# Patient Record
Sex: FEMALE | Race: BLACK OR AFRICAN AMERICAN | Employment: FULL TIME | URBAN - METROPOLITAN AREA
[De-identification: names, ages, dates, MRNs, and addresses within clinical notes are randomized per-mention and may not be internally consistent; named-entity substitution may affect disease eponyms.]

---

## 2024-03-04 ENCOUNTER — HOSPITAL ENCOUNTER (EMERGENCY)
Age: 34
Discharge: HOME | End: 2024-03-04
Attending: EMERGENCY MEDICINE
Payer: MEDICAID

## 2024-03-04 VITALS
HEART RATE: 77 BPM | OXYGEN SATURATION: 99 % | WEIGHT: 260 LBS | DIASTOLIC BLOOD PRESSURE: 91 MMHG | SYSTOLIC BLOOD PRESSURE: 130 MMHG | BODY MASS INDEX: 41.78 KG/M2 | TEMPERATURE: 99 F | HEIGHT: 66 IN | RESPIRATION RATE: 20 BRPM

## 2024-03-04 DIAGNOSIS — J11.1 INFLUENZA: Primary | ICD-10-CM

## 2024-03-04 LAB
POCT INFLUENZA A: NEGATIVE
POCT INFLUENZA B: POSITIVE
SARS-COV-2 RNA RESP QL NAA+PROBE: NOT DETECTED

## 2024-03-04 PROCEDURE — 87502 INFLUENZA DNA AMP PROBE: CPT | Performed by: EMERGENCY MEDICINE

## 2024-03-04 PROCEDURE — 99283 EMERGENCY DEPT VISIT LOW MDM: CPT

## 2024-03-04 PROCEDURE — 87502 INFLUENZA DNA AMP PROBE: CPT

## 2024-03-04 RX ORDER — ACETAMINOPHEN 500 MG
1000 TABLET ORAL ONCE
Status: COMPLETED | OUTPATIENT
Start: 2024-03-04 | End: 2024-03-04

## 2024-03-05 NOTE — DISCHARGE INSTRUCTIONS
Tylenol or Motrin for fever    Flonase and nasal saline for sinuses    Push fluids    Robitussin for cough

## 2024-03-05 NOTE — ED PROVIDER NOTES
Patient Seen in: [facility] In [place]      History     Chief Complaint   Patient presents with    Fever     Stated Complaint: fever, cough    Subjective:   HPI    Patient is a 33-year-old female presents emergency room for evaluation of flulike symptoms.  Patient has had symptoms for 4 days.  She complains fever, cough, runny nose, body aches, headache and sore throat.  Patient denies shortness of breath.  No other complaints    Objective:   History reviewed. No pertinent past medical history.           History reviewed. No pertinent surgical history.             No pertinent social history.            Review of Systems    Positive for stated complaint: fever, cough  Other systems are as noted in HPI.  Constitutional and vital signs reviewed.      All other systems reviewed and negative except as noted above.    Physical Exam     ED Triage Vitals [03/04/24 1758]   BP (!) 130/91   Pulse 86   Resp 18   Temp 99 °F (37.2 °C)   Temp src Temporal   SpO2 99 %   O2 Device None (Room air)       Current:BP (!) 130/91   Pulse 77   Temp 99.2 °F (37.3 °C) (Oral)   Resp 20   Ht 167.6 cm (5' 6\")   Wt 117.9 kg   LMP 03/02/2024   SpO2 99%   BMI 41.97 kg/m²         Physical Exam    GENERAL: No acute distress, well appearing and non-toxic, Alert and oriented X 3   HEENT: Normocephalic, atraumatic.  Moist mucous membranes.  Pupils equal round reactive to light accommodation, extraocular motion is intact, sclerae white, conjunctiva is pink.  Oropharynx is unremarkable, no exudate. TMs clear bilaterally  NECK: Supple, trachea midline, no lymphadenopathy.   LUNG: Lungs clear to auscultation bilaterally, no wheezing, no rales, no rhonchi.  CARDIOVASCULAR: Regular rate and rhythm.  Normal S1S2.  No S3S4 or murmur.  SKIN:  warm and dry  NEURO:  no focal deficits    ED Course     Labs Reviewed   POCT FLU TEST - Abnormal; Notable for the following components:       Result Value    POCT INFLUENZA B Positive (*)      All other components within normal limits    Narrative:     This assay is a rapid molecular in vitro test utilizing nucleic acid amplification of influenza A and B viral RNA.   RAPID SARS-COV-2 BY PCR - Normal                      MDM      Patient is a 33-year-old female presents emergency room for evaluation of flulike symptoms.  Differential includes COVID, flu.  Flu be positive.  COVID-negative.  Recommend supportive treatment.  No indication for Tamiflu.    Patient was screened and evaluated during this visit.   As a treating physician attending to the patient, I determined, within reasonable clinical confidence and prior to discharge, that an emergency medical condition was not or was no longer present.  There was no indication for further evaluation, treatment or admission on an emergency basis.  Comprehensive verbal and written discharge and follow-up instructions were provided to help prevent relapse or worsening.  Patient was instructed to follow-up with her primary care provider for further evaluation and treatment, but to return immediately to the ER for worsening, concerning, new, changing or persisting symptoms.  I discussed the case with the patient and they had no questions, complaints, or concerns.  Patient felt comfortable going home.                 MDM    Disposition and Plan     Clinical Impression:  1. Influenza         Disposition:  Discharge  3/4/2024  7:51 pm    Follow-up:  Leonela Harrell MD  [id number] [facility] [address]  [phone]    Follow up  As needed          Medications Prescribed:  There are no discharge medications for this patient.

## 2024-08-15 ENCOUNTER — EMPLOYEE HEALTH (OUTPATIENT)
Dept: OTHER | Facility: HOSPITAL | Age: 34
End: 2024-08-15
Attending: PREVENTIVE MEDICINE

## 2024-08-15 DIAGNOSIS — Z01.84 IMMUNITY STATUS TESTING: ICD-10-CM

## 2024-08-15 DIAGNOSIS — Z11.1 SCREENING-PULMONARY TB: Primary | ICD-10-CM

## 2024-08-15 LAB
RUBV IGG SER QL: POSITIVE
RUBV IGG SER-ACNC: 78 IU/ML (ref 10–?)

## 2024-08-15 PROCEDURE — 86787 VARICELLA-ZOSTER ANTIBODY: CPT

## 2024-08-15 PROCEDURE — 86762 RUBELLA ANTIBODY: CPT

## 2024-08-15 PROCEDURE — 86480 TB TEST CELL IMMUN MEASURE: CPT

## 2024-08-15 PROCEDURE — 86765 RUBEOLA ANTIBODY: CPT

## 2024-08-15 PROCEDURE — 86735 MUMPS ANTIBODY: CPT

## 2024-08-16 LAB
MEV IGG SER-ACNC: >300 AU/ML (ref 16.5–?)
MUV IGG SER IA-ACNC: 42.5 AU/ML (ref 11–?)
VZV IGG SER IA-ACNC: 627.1 (ref 165–?)

## 2024-08-17 LAB
M TB IFN-G CD4+ T-CELLS BLD-ACNC: 0.03 IU/ML
M TB TUBERC IFN-G BLD QL: NEGATIVE
M TB TUBERC IGNF/MITOGEN IGNF CONTROL: >10 IU/ML
QFT TB1 AG MINUS NIL: 0.04 IU/ML
QFT TB2 AG MINUS NIL: 0.08 IU/ML

## (undated) NOTE — LETTER
March 4, 2024    Patient: Mena El   Date of Visit: 3/4/2024       To Whom It May Concern:    Mena El was seen and treated in our emergency department on 3/4/2024. She should not return to work until 3/9/24 .    If you have any questions or concerns, please don't hesitate to call.       Encounter Provider(s):    Mervin Bergeron MD